# Patient Record
Sex: FEMALE | Race: WHITE
[De-identification: names, ages, dates, MRNs, and addresses within clinical notes are randomized per-mention and may not be internally consistent; named-entity substitution may affect disease eponyms.]

---

## 2020-05-15 ENCOUNTER — HOSPITAL ENCOUNTER (EMERGENCY)
Dept: HOSPITAL 8 - ED | Age: 50
Discharge: HOME | End: 2020-05-15
Payer: COMMERCIAL

## 2020-05-15 VITALS — SYSTOLIC BLOOD PRESSURE: 131 MMHG | DIASTOLIC BLOOD PRESSURE: 70 MMHG

## 2020-05-15 VITALS — HEIGHT: 60 IN | BODY MASS INDEX: 22.07 KG/M2 | WEIGHT: 112.44 LBS

## 2020-05-15 DIAGNOSIS — K62.3: Primary | ICD-10-CM

## 2020-05-15 DIAGNOSIS — R10.9: ICD-10-CM

## 2020-05-15 DIAGNOSIS — R19.7: ICD-10-CM

## 2020-05-15 LAB
ALBUMIN SERPL-MCNC: 4 G/DL (ref 3.4–5)
ALP SERPL-CCNC: 51 U/L (ref 45–117)
ALT SERPL-CCNC: 43 U/L (ref 12–78)
ANION GAP SERPL CALC-SCNC: 3 MMOL/L (ref 5–15)
BASOPHILS # BLD AUTO: 0.04 X10^3/UL (ref 0–0.1)
BASOPHILS NFR BLD AUTO: 0 % (ref 0–1)
BILIRUB SERPL-MCNC: 0.3 MG/DL (ref 0.2–1)
CALCIUM SERPL-MCNC: 9.1 MG/DL (ref 8.5–10.1)
CHLORIDE SERPL-SCNC: 109 MMOL/L (ref 98–107)
CREAT SERPL-MCNC: 1.04 MG/DL (ref 0.55–1.02)
EOSINOPHIL # BLD AUTO: 0.02 X10^3/UL (ref 0–0.4)
EOSINOPHIL NFR BLD AUTO: 0 % (ref 1–7)
ERYTHROCYTE [DISTWIDTH] IN BLOOD BY AUTOMATED COUNT: 14 % (ref 9.6–15.2)
LYMPHOCYTES # BLD AUTO: 1.64 X10^3/UL (ref 1–3.4)
LYMPHOCYTES NFR BLD AUTO: 15 % (ref 22–44)
MCH RBC QN AUTO: 31.8 PG (ref 27–34.8)
MCHC RBC AUTO-ENTMCNC: 33.1 G/DL (ref 32.4–35.8)
MCV RBC AUTO: 96.2 FL (ref 80–100)
MD: NO
MONOCYTES # BLD AUTO: 0.38 X10^3/UL (ref 0.2–0.8)
MONOCYTES NFR BLD AUTO: 3 % (ref 2–9)
NEUTROPHILS # BLD AUTO: 9.2 X10^3/UL (ref 1.8–6.8)
NEUTROPHILS NFR BLD AUTO: 82 % (ref 42–75)
PLATELET # BLD AUTO: 337 X10^3/UL (ref 130–400)
PMV BLD AUTO: 6.7 FL (ref 7.4–10.4)
PROT SERPL-MCNC: 7.6 G/DL (ref 6.4–8.2)
RBC # BLD AUTO: 4.36 X10^6/UL (ref 3.82–5.3)

## 2020-05-15 PROCEDURE — 96376 TX/PRO/DX INJ SAME DRUG ADON: CPT

## 2020-05-15 PROCEDURE — 83605 ASSAY OF LACTIC ACID: CPT

## 2020-05-15 PROCEDURE — 96375 TX/PRO/DX INJ NEW DRUG ADDON: CPT

## 2020-05-15 PROCEDURE — 85025 COMPLETE CBC W/AUTO DIFF WBC: CPT

## 2020-05-15 PROCEDURE — 99285 EMERGENCY DEPT VISIT HI MDM: CPT

## 2020-05-15 PROCEDURE — 80053 COMPREHEN METABOLIC PANEL: CPT

## 2020-05-15 PROCEDURE — 74177 CT ABD & PELVIS W/CONTRAST: CPT

## 2020-05-15 PROCEDURE — 96374 THER/PROPH/DIAG INJ IV PUSH: CPT

## 2020-05-15 PROCEDURE — 36415 COLL VENOUS BLD VENIPUNCTURE: CPT

## 2020-05-15 RX ADMIN — MORPHINE SULFATE PRN MG: 4 INJECTION INTRAVENOUS at 08:33

## 2020-05-15 RX ADMIN — MORPHINE SULFATE PRN MG: 4 INJECTION INTRAVENOUS at 09:17

## 2020-05-15 NOTE — NUR
Patient medicated for pain per mar with some relief. Labs sent.  Awaiting ct. 
call light with in reach.

## 2020-05-15 NOTE — NUR
pt re-medicated per mar for pain. iphone plugged in at front dest for pt.  
awaiting imaging results. call light with in reach.

## 2020-06-12 ENCOUNTER — HOSPITAL ENCOUNTER (EMERGENCY)
Dept: HOSPITAL 8 - ED | Age: 50
Discharge: HOME | End: 2020-06-12
Payer: COMMERCIAL

## 2020-06-12 VITALS — SYSTOLIC BLOOD PRESSURE: 98 MMHG | DIASTOLIC BLOOD PRESSURE: 58 MMHG

## 2020-06-12 VITALS — WEIGHT: 110.23 LBS | BODY MASS INDEX: 21.64 KG/M2 | HEIGHT: 60 IN

## 2020-06-12 DIAGNOSIS — R11.2: ICD-10-CM

## 2020-06-12 DIAGNOSIS — R00.0: ICD-10-CM

## 2020-06-12 DIAGNOSIS — K62.5: ICD-10-CM

## 2020-06-12 DIAGNOSIS — K62.89: Primary | ICD-10-CM

## 2020-06-12 DIAGNOSIS — G89.18: ICD-10-CM

## 2020-06-12 LAB
ALBUMIN SERPL-MCNC: 3.6 G/DL (ref 3.4–5)
ALP SERPL-CCNC: 72 U/L (ref 45–117)
ALT SERPL-CCNC: 17 U/L (ref 12–78)
ANION GAP SERPL CALC-SCNC: 5 MMOL/L (ref 5–15)
BASOPHILS # BLD AUTO: 0.02 X10^3/UL (ref 0–0.1)
BASOPHILS NFR BLD AUTO: 0 % (ref 0–1)
BILIRUB SERPL-MCNC: 0.2 MG/DL (ref 0.2–1)
CALCIUM SERPL-MCNC: 8.8 MG/DL (ref 8.5–10.1)
CHLORIDE SERPL-SCNC: 107 MMOL/L (ref 98–107)
CREAT SERPL-MCNC: 1.08 MG/DL (ref 0.55–1.02)
EOSINOPHIL # BLD AUTO: 0.06 X10^3/UL (ref 0–0.4)
EOSINOPHIL NFR BLD AUTO: 1 % (ref 1–7)
ERYTHROCYTE [DISTWIDTH] IN BLOOD BY AUTOMATED COUNT: 12.9 % (ref 9.6–15.2)
LYMPHOCYTES # BLD AUTO: 2.16 X10^3/UL (ref 1–3.4)
LYMPHOCYTES NFR BLD AUTO: 23 % (ref 22–44)
MCH RBC QN AUTO: 31.5 PG (ref 27–34.8)
MCHC RBC AUTO-ENTMCNC: 33.6 G/DL (ref 32.4–35.8)
MCV RBC AUTO: 94 FL (ref 80–100)
MD: NO
MONOCYTES # BLD AUTO: 0.68 X10^3/UL (ref 0.2–0.8)
MONOCYTES NFR BLD AUTO: 7 % (ref 2–9)
NEUTROPHILS # BLD AUTO: 6.35 X10^3/UL (ref 1.8–6.8)
NEUTROPHILS NFR BLD AUTO: 69 % (ref 42–75)
PLATELET # BLD AUTO: 345 X10^3/UL (ref 130–400)
PMV BLD AUTO: 6.6 FL (ref 7.4–10.4)
PROT SERPL-MCNC: 7.6 G/DL (ref 6.4–8.2)
RBC # BLD AUTO: 3.83 X10^6/UL (ref 3.82–5.3)

## 2020-06-12 PROCEDURE — 85025 COMPLETE CBC W/AUTO DIFF WBC: CPT

## 2020-06-12 PROCEDURE — 96376 TX/PRO/DX INJ SAME DRUG ADON: CPT

## 2020-06-12 PROCEDURE — 99284 EMERGENCY DEPT VISIT MOD MDM: CPT

## 2020-06-12 PROCEDURE — 80053 COMPREHEN METABOLIC PANEL: CPT

## 2020-06-12 PROCEDURE — 36415 COLL VENOUS BLD VENIPUNCTURE: CPT

## 2020-06-12 PROCEDURE — 96361 HYDRATE IV INFUSION ADD-ON: CPT

## 2020-06-12 PROCEDURE — 96375 TX/PRO/DX INJ NEW DRUG ADDON: CPT

## 2020-06-12 PROCEDURE — 96374 THER/PROPH/DIAG INJ IV PUSH: CPT

## 2020-06-12 RX ADMIN — MORPHINE SULFATE PRN MG: 4 INJECTION INTRAVENOUS at 19:26

## 2020-06-12 RX ADMIN — MORPHINE SULFATE PRN MG: 4 INJECTION INTRAVENOUS at 21:10

## 2020-06-13 ENCOUNTER — HOSPITAL ENCOUNTER (EMERGENCY)
Dept: HOSPITAL 8 - ED | Age: 50
Discharge: HOME | End: 2020-06-13
Payer: COMMERCIAL

## 2020-06-13 VITALS — BODY MASS INDEX: 21.64 KG/M2 | HEIGHT: 60 IN | WEIGHT: 110.23 LBS

## 2020-06-13 VITALS — DIASTOLIC BLOOD PRESSURE: 71 MMHG | SYSTOLIC BLOOD PRESSURE: 154 MMHG

## 2020-06-13 DIAGNOSIS — R11.2: ICD-10-CM

## 2020-06-13 DIAGNOSIS — K62.89: ICD-10-CM

## 2020-06-13 DIAGNOSIS — R33.8: Primary | ICD-10-CM

## 2020-06-13 DIAGNOSIS — G89.18: ICD-10-CM

## 2020-06-13 DIAGNOSIS — Z87.19: ICD-10-CM

## 2020-06-13 LAB — MICROSCOPIC: (no result)

## 2020-06-13 PROCEDURE — 96374 THER/PROPH/DIAG INJ IV PUSH: CPT

## 2020-06-13 PROCEDURE — 80307 DRUG TEST PRSMV CHEM ANLYZR: CPT

## 2020-06-13 PROCEDURE — 72193 CT PELVIS W/DYE: CPT

## 2020-06-13 PROCEDURE — 81003 URINALYSIS AUTO W/O SCOPE: CPT

## 2020-06-13 PROCEDURE — 99285 EMERGENCY DEPT VISIT HI MDM: CPT

## 2020-06-13 PROCEDURE — 96361 HYDRATE IV INFUSION ADD-ON: CPT

## 2020-06-13 NOTE — NUR
THIS IS A 49Y F THAT COMES INTO THE ER, PT WAS SEEN EARLIER FOR PAIN POST OP.  
PT RETURNS FOR WORSENING PAIN/ N/V. PT STS SHE WAS UNABLE TO FILL MEDICATION 
AFTER LEAVING. PT IS WRITHING ON GURNEY CRYING STS "I JUST NEED SOMEONE TO CARE 
FOR ME THIS IS BARBARIC." PT REPORTS BEING UNABLE TO TAKE TYLENOL 3 AT HOME DUE 
TO NAUSEA AND NOT BEING ABLE TO KEEP FOOD DOWN. PT ABLE TO SPEAK IN FULL 
SENTENCES AND FOLLOW COMMANDS. AWAITING FURTHER ORDERS AT THIS TIME

## 2020-06-13 NOTE — NUR
CHARGE RN: PT LEFT WITH ALL PERSONAL BELONGINGS. PT AMBULATORY WITH STEADY 
GAIT. PT ABLE TO GET DRESSED WITH BARON WITH NO COMPLICATIONS. PT LEFT WITH ALL 
D/C PAPERWORK AND STATED "I DON'T HAVE ANYMORE QUESTIONS."

## 2020-06-13 NOTE — NUR
CHARGE RN: SPOKE WITH PATIENT REGARDING DISCHARGE AND BARON. PT SPEAKING WITH 
HER DAD ON THE PHONE ALSO, HE IS STATING "I'VE BEEN HOME WITH A BARON. YOU CAN 
DO IT AND WILL BE FINE."  PT EDUCATED REGARDING BARON CARE. PT ALSO EDUCATED 
REGARDING THE RISKS OF REMOVING THE BARON AND POSSIBLITIES OF RETURNING TO ED 
FOR THE SAME ISSUES.  PT STATES SHE HAS A DR. MONTES MONDAY. PT HAS FRIEND 
BEDSIDE. ALL QUESTIONS HAVE BEEN ANSWERED. PT IS IN AGREEANCE TO DISCHARGE. PT 
STATES "THANK YOU FOR EXPLAINING IT. I'M JUST TIRED. I HAD SURGERY LAST WEEK 
AND I'M TIRED."  PT VERBALIZED UNDERSTANDING TO ALL INSTRUCTION.

## 2020-06-13 NOTE — NUR
CHARGE RN IN  AT THIS TIME, PER REPORT PT NOT SURE IF SHE LIKE TO DC WITH 
BARON IN PLACE OR WOULD LIKE IT REMOVED. SECURITY OUTSIDE OF  ON STANDBY.

## 2020-06-13 NOTE — NUR
PT MEDICATED PER MAR FOR PAIN. PT SHOUTING AT RN, PT STS SHE HAS NOT EATEN IN 
TWO DAYS. PT EDUCATED THAT SHE HAS GOTTEN ZOFRAN FOR NAUSEA AND TO LET RN KNOW 
IF SHE FEELS READY TO TRY TO EAT.